# Patient Record
Sex: MALE | Race: WHITE | Employment: UNEMPLOYED | URBAN - METROPOLITAN AREA
[De-identification: names, ages, dates, MRNs, and addresses within clinical notes are randomized per-mention and may not be internally consistent; named-entity substitution may affect disease eponyms.]

---

## 2019-06-21 ENCOUNTER — HOSPITAL ENCOUNTER (EMERGENCY)
Age: 29
Discharge: HOME | End: 2019-06-21
Attending: EMERGENCY MEDICINE
Payer: MEDICAID

## 2019-06-21 VITALS
OXYGEN SATURATION: 100 % | SYSTOLIC BLOOD PRESSURE: 121 MMHG | TEMPERATURE: 97.3 F | HEART RATE: 61 BPM | RESPIRATION RATE: 13 BRPM | DIASTOLIC BLOOD PRESSURE: 79 MMHG | HEIGHT: 71 IN | WEIGHT: 160.27 LBS | BODY MASS INDEX: 22.44 KG/M2

## 2019-06-21 DIAGNOSIS — G47.00 INSOMNIA, UNSPECIFIED TYPE: ICD-10-CM

## 2019-06-21 DIAGNOSIS — T50.901A ACCIDENTAL DRUG OVERDOSE, INITIAL ENCOUNTER: Primary | ICD-10-CM

## 2019-06-21 LAB
GLUCOSE BLD-MCNC: 131 MG/DL (ref 70–99)
PERFORMED ON: ABNORMAL

## 2019-06-21 PROCEDURE — 99284 EMERGENCY DEPT VISIT MOD MDM: CPT
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
## 2019-06-21 NOTE — ED NOTES
Patient continues to sleep without movement. Easy respirations. No acute distress noted. Patient will be DC when awake with a ride.       Juan Worley RN  06/21/19 4332

## 2019-06-21 NOTE — ED NOTES
Patient woken and ambulated around nurses station. Patient still sleepy but answering questions. Patient states that he is unsure how he got here or why he is in the ER. Dr. Kylee Lake at bedside to speak with patient.       Judy Joe RN  06/21/19 [id number]

## 2019-06-21 NOTE — ED NOTES
Bed: [facility] ([place]  Expected date: 6/21/19  Expected time: 7:59 AM  Means of arrival: [facility]  Comments:  Maneeži 38, RN  06/21/19 4737

## 2019-06-21 NOTE — ED NOTES
--Patient provided with discharge instructions and any prescriptions. --Instructions, dosing, and follow-up appointments reviewed with patient/family. No further questions or needs at this time. --Vital signs and patient stable upon discharge. --Patient ambulatory to [facility]. Offered wheelchair from department, patient declined need. [facility] information given to patient at time of DC. Patient opts to walk home rather than wait for someone to come pick him up. MD aware and agreeable.       Hina Funes RN  06/21/19 4314

## 2019-06-21 NOTE — ED NOTES
Addiction Navigator attempted to wake patient to discuss treatment options, but pt wouldn't wake. Addiction Navigator left resources for pt at discharge.       Rebecca Mariscal  06/21/19 3164

## 2019-06-21 NOTE — ED NOTES
Patient continues to sleep with easy respirations. Awakens to stimuli.  O2 remains 100% on RA     Campbell Vogel RN  06/21/19 [id number]
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
